# Patient Record
Sex: MALE | Race: OTHER | NOT HISPANIC OR LATINO
[De-identification: names, ages, dates, MRNs, and addresses within clinical notes are randomized per-mention and may not be internally consistent; named-entity substitution may affect disease eponyms.]

---

## 2020-07-16 PROBLEM — Z00.00 ENCOUNTER FOR PREVENTIVE HEALTH EXAMINATION: Status: ACTIVE | Noted: 2020-07-16

## 2020-07-17 ENCOUNTER — APPOINTMENT (OUTPATIENT)
Dept: COLORECTAL SURGERY | Facility: CLINIC | Age: 40
End: 2020-07-17
Payer: MEDICAID

## 2020-07-17 VITALS
RESPIRATION RATE: 15 BRPM | BODY MASS INDEX: 25.92 KG/M2 | SYSTOLIC BLOOD PRESSURE: 122 MMHG | HEIGHT: 69 IN | HEART RATE: 71 BPM | WEIGHT: 175 LBS | TEMPERATURE: 96.1 F | DIASTOLIC BLOOD PRESSURE: 78 MMHG

## 2020-07-17 DIAGNOSIS — E11.9 TYPE 2 DIABETES MELLITUS W/OUT COMPLICATIONS: ICD-10-CM

## 2020-07-17 DIAGNOSIS — K64.9 UNSPECIFIED HEMORRHOIDS: ICD-10-CM

## 2020-07-17 DIAGNOSIS — F17.200 NICOTINE DEPENDENCE, UNSPECIFIED, UNCOMPLICATED: ICD-10-CM

## 2020-07-17 PROCEDURE — 99204 OFFICE O/P NEW MOD 45 MIN: CPT | Mod: 25

## 2020-07-17 PROCEDURE — 46600 DIAGNOSTIC ANOSCOPY SPX: CPT

## 2020-07-17 RX ORDER — METFORMIN HYDROCHLORIDE 625 MG/1
TABLET ORAL
Refills: 0 | Status: ACTIVE | COMMUNITY

## 2020-07-17 NOTE — HISTORY OF PRESENT ILLNESS
[FreeTextEntry1] : Patient is a 40 yo male here with complaints of a painful swelling that comes and goes.  He states that it has drained puss in the past.  He states he has no issues with bowel movements.

## 2020-07-17 NOTE — ASSESSMENT
[FreeTextEntry1] : The patient is offered an exam under anesthesia with fistulotomy vs seton. R/B/A of the procedure are d/w him including but not limited to pain, bleeding, infection and incontinence. \par He understands and wishes to proceed.

## 2020-07-17 NOTE — PHYSICAL EXAM
[Normal Breath Sounds] : Normal breath sounds [Normal Rate and Rhythm] : normal rate and rhythm [Normal Heart Sounds] : normal heart sounds [No Rash or Lesion] : No rash or lesion [Oriented to Person] : oriented to person [Alert] : alert [Oriented to Time] : oriented to time [Oriented to Place] : oriented to place [Calm] : calm [Excoriation] : no perianal excoriation [Wart] : no warts [Ulcer ___ cm] : no ulcers [Normal] : was normal [None] : there was no rectal mass  [de-identified] : soft, NT/ND,+BS [de-identified] : right anterior area of induration, almost near the scrotal base, no purulent drainage currently or cellulitis [de-identified] : anoscopy reveals no proctitis, no obvious internal opening [de-identified] : well nourished female [de-identified] : NC/AT [de-identified] : SOTO/+ROM [de-identified] : Intact

## 2020-07-17 NOTE — CONSULT LETTER
[Dear  ___] : Dear  [unfilled], [Consult Letter:] : I had the pleasure of evaluating your patient, [unfilled]. [Please see my note below.] : Please see my note below. [Consult Closing:] : Thank you very much for allowing me to participate in the care of this patient.  If you have any questions, please do not hesitate to contact me. [Sincerely,] : Sincerely, [FreeTextEntry3] : Rubén Silvestre MD, FACS, FASCRS\par Colorectal Surgery\par [facility]\par Assistant Professor of Surgery [employer] at [facility]\par [address] [place] [address], [address]\[place] [address]\par Tel: [phone] \par Cell: [phone] \par Email: [email or web address]\par

## 2020-07-19 DIAGNOSIS — Z01.818 ENCOUNTER FOR OTHER PREPROCEDURAL EXAMINATION: ICD-10-CM

## 2020-07-20 ENCOUNTER — TRANSCRIPTION ENCOUNTER (OUTPATIENT)
Age: 40
End: 2020-07-20

## 2020-07-20 ENCOUNTER — APPOINTMENT (OUTPATIENT)
Dept: DISASTER EMERGENCY | Facility: CLINIC | Age: 40
End: 2020-07-20

## 2020-07-22 ENCOUNTER — TRANSCRIPTION ENCOUNTER (OUTPATIENT)
Age: 40
End: 2020-07-22

## 2020-07-22 VITALS
WEIGHT: 179.9 LBS | HEART RATE: 98 BPM | SYSTOLIC BLOOD PRESSURE: 119 MMHG | TEMPERATURE: 98 F | RESPIRATION RATE: 18 BRPM | DIASTOLIC BLOOD PRESSURE: 78 MMHG | HEIGHT: 69 IN | OXYGEN SATURATION: 99 %

## 2020-07-22 LAB — SARS-COV-2 N GENE NPH QL NAA+PROBE: NOT DETECTED

## 2020-07-22 RX ORDER — LIDOCAINE HCL 20 MG/ML
0.2 VIAL (ML) INJECTION ONCE
Refills: 0 | Status: DISCONTINUED | OUTPATIENT
Start: 2020-07-23 | End: 2020-08-07

## 2020-07-22 RX ORDER — SODIUM CHLORIDE 9 MG/ML
3 INJECTION INTRAMUSCULAR; INTRAVENOUS; SUBCUTANEOUS EVERY 8 HOURS
Refills: 0 | Status: DISCONTINUED | OUTPATIENT
Start: 2020-07-23 | End: 2020-08-07

## 2020-07-22 NOTE — H&P PST ADULT - NSICDXPROBLEM_GEN_ALL_CORE_FT
PROBLEM DIAGNOSES  Problem: Anal fistula  Assessment and Plan: Fistulotomy VS Seton on 7/23/2020.     Problem: Type 2 diabetes mellitus  Assessment and Plan: Check fingerstick DOS

## 2020-07-22 NOTE — H&P PST ADULT - NSANTHOSAYNRD_GEN_A_CORE
No. ALLEY screening performed.  STOP BANG Legend: 0-2 = LOW Risk; 3-4 = INTERMEDIATE Risk; 5-8 = HIGH Risk

## 2020-07-22 NOTE — H&P PST ADULT - HISTORY OF PRESENT ILLNESS
39 year old male with history of Type 2 DM, Anal fistula for past 1year, with drainage & pain last week, Coming in for Fistulotomy VS Seton on 7/23/2020. 39 year old male with history of Type 2 DM, Anal fistula for past 1year, with drainage & pain last week, Coming in for Fistulotomy VS Seton on 7/23/2020.     Covid test negative 7/20/2020  History  obtained via teleconference 7/22/2020 39 year old male with history of Type 2 DM, Anal fistula for past 1year, with drainage & pain last week,  Presents Fistulotomy 7/30/20. Pt reports he had surgery  7/23/2020 however he did not have a fistula at that time.     Covid test negative 7/29/2020  History  obtained via teleconference 7/22/2020

## 2020-07-23 ENCOUNTER — NON-APPOINTMENT (OUTPATIENT)
Age: 40
End: 2020-07-23

## 2020-07-23 ENCOUNTER — OUTPATIENT (OUTPATIENT)
Dept: OUTPATIENT SERVICES | Facility: HOSPITAL | Age: 40
LOS: 1 days | End: 2020-07-23
Payer: MEDICAID

## 2020-07-23 ENCOUNTER — APPOINTMENT (OUTPATIENT)
Dept: COLORECTAL SURGERY | Facility: HOSPITAL | Age: 40
End: 2020-07-23
Payer: MEDICAID

## 2020-07-23 VITALS
DIASTOLIC BLOOD PRESSURE: 54 MMHG | HEART RATE: 69 BPM | OXYGEN SATURATION: 100 % | RESPIRATION RATE: 16 BRPM | SYSTOLIC BLOOD PRESSURE: 105 MMHG

## 2020-07-23 DIAGNOSIS — K60.3 ANAL FISTULA: ICD-10-CM

## 2020-07-23 DIAGNOSIS — Z90.89 ACQUIRED ABSENCE OF OTHER ORGANS: Chronic | ICD-10-CM

## 2020-07-23 DIAGNOSIS — E11.9 TYPE 2 DIABETES MELLITUS WITHOUT COMPLICATIONS: ICD-10-CM

## 2020-07-23 LAB
A1C WITH ESTIMATED AVERAGE GLUCOSE RESULT: 7.5 % — HIGH (ref 4–5.6)
ANION GAP SERPL CALC-SCNC: 10 MMOL/L — SIGNIFICANT CHANGE UP (ref 5–17)
BASOPHILS # BLD AUTO: 0.09 K/UL — SIGNIFICANT CHANGE UP (ref 0–0.2)
BASOPHILS NFR BLD AUTO: 0.8 % — SIGNIFICANT CHANGE UP (ref 0–2)
BUN SERPL-MCNC: 10 MG/DL — SIGNIFICANT CHANGE UP (ref 7–23)
CALCIUM SERPL-MCNC: 9.1 MG/DL — SIGNIFICANT CHANGE UP (ref 8.4–10.5)
CHLORIDE SERPL-SCNC: 103 MMOL/L — SIGNIFICANT CHANGE UP (ref 96–108)
CO2 SERPL-SCNC: 24 MMOL/L — SIGNIFICANT CHANGE UP (ref 22–31)
CREAT SERPL-MCNC: 0.71 MG/DL — SIGNIFICANT CHANGE UP (ref 0.5–1.3)
EOSINOPHIL # BLD AUTO: 0.7 K/UL — HIGH (ref 0–0.5)
EOSINOPHIL NFR BLD AUTO: 6 % — SIGNIFICANT CHANGE UP (ref 0–6)
ESTIMATED AVERAGE GLUCOSE: 169 MG/DL — HIGH (ref 68–114)
GLUCOSE BLDC GLUCOMTR-MCNC: 137 MG/DL — HIGH (ref 70–99)
GLUCOSE SERPL-MCNC: 142 MG/DL — HIGH (ref 70–99)
HCT VFR BLD CALC: 44.3 % — SIGNIFICANT CHANGE UP (ref 39–50)
HGB BLD-MCNC: 15 G/DL — SIGNIFICANT CHANGE UP (ref 13–17)
LYMPHOCYTES # BLD AUTO: 4.9 K/UL — HIGH (ref 1–3.3)
LYMPHOCYTES # BLD AUTO: 41.9 % — SIGNIFICANT CHANGE UP (ref 13–44)
MCHC RBC-ENTMCNC: 29.2 PG — SIGNIFICANT CHANGE UP (ref 27–34)
MCHC RBC-ENTMCNC: 33.9 GM/DL — SIGNIFICANT CHANGE UP (ref 32–36)
MCV RBC AUTO: 86.2 FL — SIGNIFICANT CHANGE UP (ref 80–100)
MONOCYTES # BLD AUTO: 0.5 K/UL — SIGNIFICANT CHANGE UP (ref 0–0.9)
MONOCYTES NFR BLD AUTO: 4.3 % — SIGNIFICANT CHANGE UP (ref 2–14)
NEUTROPHILS # BLD AUTO: 5.49 K/UL — SIGNIFICANT CHANGE UP (ref 1.8–7.4)
NEUTROPHILS NFR BLD AUTO: 47 % — SIGNIFICANT CHANGE UP (ref 43–77)
PLATELET # BLD AUTO: 317 K/UL — SIGNIFICANT CHANGE UP (ref 150–400)
POTASSIUM SERPL-MCNC: 4 MMOL/L — SIGNIFICANT CHANGE UP (ref 3.5–5.3)
POTASSIUM SERPL-SCNC: 4 MMOL/L — SIGNIFICANT CHANGE UP (ref 3.5–5.3)
RBC # BLD: 5.14 M/UL — SIGNIFICANT CHANGE UP (ref 4.2–5.8)
RBC # FLD: 13.3 % — SIGNIFICANT CHANGE UP (ref 10.3–14.5)
SODIUM SERPL-SCNC: 137 MMOL/L — SIGNIFICANT CHANGE UP (ref 135–145)
WBC # BLD: 11.69 K/UL — HIGH (ref 3.8–10.5)
WBC # FLD AUTO: 11.69 K/UL — HIGH (ref 3.8–10.5)

## 2020-07-23 PROCEDURE — 46600 DIAGNOSTIC ANOSCOPY SPX: CPT

## 2020-07-23 PROCEDURE — 83036 HEMOGLOBIN GLYCOSYLATED A1C: CPT

## 2020-07-23 PROCEDURE — 80048 BASIC METABOLIC PNL TOTAL CA: CPT

## 2020-07-23 PROCEDURE — 82962 GLUCOSE BLOOD TEST: CPT

## 2020-07-23 PROCEDURE — 45990 SURG DX EXAM ANORECTAL: CPT

## 2020-07-23 PROCEDURE — 85027 COMPLETE CBC AUTOMATED: CPT

## 2020-07-23 NOTE — ASU DISCHARGE PLAN (ADULT/PEDIATRIC) - CARE PROVIDER_API CALL
Rubén Silvestre  COLON/RECTAL SURGERY  [address] [place] [address].  [place] [address]  Phone: [phone]  Fax: [phone]  Follow Up Time: 2 weeks

## 2020-07-27 PROBLEM — K60.3 ANAL FISTULA: Chronic | Status: ACTIVE | Noted: 2020-07-22

## 2020-07-27 PROBLEM — E11.9 TYPE 2 DIABETES MELLITUS WITHOUT COMPLICATIONS: Chronic | Status: ACTIVE | Noted: 2020-07-22

## 2020-07-29 ENCOUNTER — APPOINTMENT (OUTPATIENT)
Dept: COLORECTAL SURGERY | Facility: CLINIC | Age: 40
End: 2020-07-29
Payer: MEDICAID

## 2020-07-29 ENCOUNTER — TRANSCRIPTION ENCOUNTER (OUTPATIENT)
Age: 40
End: 2020-07-29

## 2020-07-29 DIAGNOSIS — R10.2 PELVIC AND PERINEAL PAIN: ICD-10-CM

## 2020-07-29 DIAGNOSIS — K60.3 ANAL FISTULA: ICD-10-CM

## 2020-07-29 PROCEDURE — 99213 OFFICE O/P EST LOW 20 MIN: CPT

## 2020-07-29 NOTE — HISTORY OF PRESENT ILLNESS
[FreeTextEntry1] : The patient presents with acute pain and swelling in the same spot as before. There is no break in the skin, no drainage

## 2020-07-29 NOTE — PHYSICAL EXAM
[None] : no anal fissures seen [Excoriation] : no perianal excoriation [Wart] : no warts [Ulcer ___ cm] : no ulcers [Normal Breath Sounds] : Normal breath sounds [Normal Heart Sounds] : normal heart sounds [Normal Rate and Rhythm] : normal rate and rhythm [No Rash or Lesion] : No rash or lesion [Alert] : alert [Oriented to Place] : oriented to place [Oriented to Person] : oriented to person [Oriented to Time] : oriented to time [de-identified] : soft, NT/ND,+BS [Calm] : calm [de-identified] : right anterior area of induration, almost near the scrotal base, no purulent drainage currently or cellulitis [de-identified] : NC/AT [de-identified] : well nourished female [de-identified] : Intact [de-identified] : SOTO/+ROM

## 2020-07-29 NOTE — ASSESSMENT
[FreeTextEntry1] : Pt will be taken urgently to the OR tomorrow for EUA and either excision of the area or fistulotomy vs seton\par covid test today

## 2020-07-30 ENCOUNTER — APPOINTMENT (OUTPATIENT)
Dept: COLORECTAL SURGERY | Facility: HOSPITAL | Age: 40
End: 2020-07-30
Payer: MEDICAID

## 2020-07-30 ENCOUNTER — OUTPATIENT (OUTPATIENT)
Dept: OUTPATIENT SERVICES | Facility: HOSPITAL | Age: 40
LOS: 1 days | End: 2020-07-30
Payer: MEDICAID

## 2020-07-30 ENCOUNTER — RESULT REVIEW (OUTPATIENT)
Age: 40
End: 2020-07-30

## 2020-07-30 VITALS
SYSTOLIC BLOOD PRESSURE: 119 MMHG | HEART RATE: 85 BPM | DIASTOLIC BLOOD PRESSURE: 64 MMHG | RESPIRATION RATE: 18 BRPM | TEMPERATURE: 97 F

## 2020-07-30 VITALS
OXYGEN SATURATION: 99 % | SYSTOLIC BLOOD PRESSURE: 112 MMHG | HEART RATE: 81 BPM | RESPIRATION RATE: 12 BRPM | DIASTOLIC BLOOD PRESSURE: 70 MMHG

## 2020-07-30 DIAGNOSIS — K60.3 ANAL FISTULA: ICD-10-CM

## 2020-07-30 DIAGNOSIS — Z90.89 ACQUIRED ABSENCE OF OTHER ORGANS: Chronic | ICD-10-CM

## 2020-07-30 LAB — GLUCOSE BLDC GLUCOMTR-MCNC: 148 MG/DL — HIGH (ref 70–99)

## 2020-07-30 PROCEDURE — 46922 EXCISION OF ANAL LESION(S): CPT

## 2020-07-30 PROCEDURE — 11400 EXC TR-EXT B9+MARG 0.5 CM<: CPT

## 2020-07-30 PROCEDURE — 88305 TISSUE EXAM BY PATHOLOGIST: CPT | Mod: 26

## 2020-07-30 PROCEDURE — C1889: CPT

## 2020-07-30 PROCEDURE — 10060 I&D ABSCESS SIMPLE/SINGLE: CPT | Mod: 59

## 2020-07-30 PROCEDURE — 88305 TISSUE EXAM BY PATHOLOGIST: CPT

## 2020-07-30 PROCEDURE — 82962 GLUCOSE BLOOD TEST: CPT

## 2020-07-30 RX ORDER — FOAM BANDAGE 4" X 4"
8"X12" BANDAGE TOPICAL
Qty: 1 | Refills: 0 | Status: ACTIVE | COMMUNITY
Start: 2020-07-30 | End: 1900-01-01

## 2020-07-30 RX ORDER — METFORMIN HYDROCHLORIDE 850 MG/1
1 TABLET ORAL
Qty: 0 | Refills: 0 | DISCHARGE

## 2020-07-30 RX ORDER — OXYCODONE HYDROCHLORIDE 5 MG/1
1 TABLET ORAL
Qty: 30 | Refills: 0
Start: 2020-07-30 | End: 2020-08-03

## 2020-07-30 RX ORDER — LIDOCAINE HCL 20 MG/ML
0.2 VIAL (ML) INJECTION ONCE
Refills: 0 | Status: DISCONTINUED | OUTPATIENT
Start: 2020-07-30 | End: 2020-08-14

## 2020-07-30 RX ORDER — SODIUM CHLORIDE 9 MG/ML
3 INJECTION INTRAMUSCULAR; INTRAVENOUS; SUBCUTANEOUS EVERY 8 HOURS
Refills: 0 | Status: DISCONTINUED | OUTPATIENT
Start: 2020-07-30 | End: 2020-08-14

## 2020-07-30 NOTE — ASU DISCHARGE PLAN (ADULT/PEDIATRIC) - NURSING INSTRUCTIONS
Last dose of TYLENOL for pain management was at 1:02 PM. Next dose of TYLENOL may be taken at or after 7:02 PM if needed. DO NOT take any products containing TYLENOL or ACETAMINOPHEN, such as VICODIN, PERCOCET, NORCO, EXCEDRIN, and any over-the-counter cold medications. DO NOT CONSUME MORE THAN 5124-0146 MG OF TYLENOL (acetaminophen) in a 24-hour period.

## 2020-07-30 NOTE — BRIEF OPERATIVE NOTE - NSICDXBRIEFPOSTOP_GEN_ALL_CORE_FT
POST-OP DIAGNOSIS:  Normal rectal exam 23-Jul-2020 10:07:43  Radha Arana
POST-OP DIAGNOSIS:  Perineal abscess 30-Jul-2020 13:35:45  Bassem Tucker

## 2020-07-30 NOTE — BRIEF OPERATIVE NOTE - NSICDXBRIEFPROCEDURE_GEN_ALL_CORE_FT
PROCEDURES:  Perineal biopsy 30-Jul-2020 14:51:17  Rubén Silvestre  Incision and drainage, abscess, vulva or perineal 30-Jul-2020 14:50:33  Rubén Silvestre

## 2020-07-30 NOTE — BRIEF OPERATIVE NOTE - NSICDXBRIEFPOSTOP_GEN_ALL_CORE_FT
POST-OP DIAGNOSIS:  Perineal lump 30-Jul-2020 14:51:53  Rubén Silvestre  Perineal abscess 30-Jul-2020 14:51:46  Rubén Silvestre

## 2020-07-30 NOTE — ASU DISCHARGE PLAN (ADULT/PEDIATRIC) - CARE PROVIDER_API CALL
Rubén Silvestre  COLON/RECTAL SURGERY  [address] [place] [address].  [place] [address]  Phone: [phone]  Fax: [phone]  Established Patient  Follow Up Time: 1 month

## 2020-07-30 NOTE — BRIEF OPERATIVE NOTE - NSICDXBRIEFPROCEDURE_GEN_ALL_CORE_FT
PROCEDURES:  Exam under anesthesia, rectal 23-Jul-2020 10:07:23  Radha Arana  Anoscopy without biopsy 23-Jul-2020 10:07:12  Radha Arana
PROCEDURES:  Incision and drainage, abscess, vulva or perineal 30-Jul-2020 13:34:57  Bassem Tucker  Excision biopsy of female perineal lesion 30-Jul-2020 13:34:43  Bassem Tucker  Surgical removal of sebaceous cyst of torso 30-Jul-2020 13:34:30  Bassem Tucker

## 2020-07-30 NOTE — ASU DISCHARGE PLAN (ADULT/PEDIATRIC) - FOLLOW UP APPOINTMENTS
[facility] ([facility]): [phone] or go to the nearest Emergency Room

## 2020-07-30 NOTE — BRIEF OPERATIVE NOTE - NSICDXBRIEFPREOP_GEN_ALL_CORE_FT
PRE-OP DIAGNOSIS:  Perineal lump 30-Jul-2020 14:51:36  Rubén Silvestre  Perineal abscess 30-Jul-2020 14:51:27  Rubén Silvestre

## 2020-08-19 ENCOUNTER — APPOINTMENT (OUTPATIENT)
Dept: COLORECTAL SURGERY | Facility: CLINIC | Age: 40
End: 2020-08-19
Payer: MEDICAID

## 2020-08-19 VITALS — SYSTOLIC BLOOD PRESSURE: 121 MMHG | RESPIRATION RATE: 98 BRPM | HEART RATE: 99 BPM | DIASTOLIC BLOOD PRESSURE: 77 MMHG

## 2020-08-19 PROCEDURE — 99213 OFFICE O/P EST LOW 20 MIN: CPT

## 2020-08-19 NOTE — HISTORY OF PRESENT ILLNESS
[FreeTextEntry1] : The patient overall feels well. last week he developed papules on his LEs, US, and buttocks. His PCP is treating him with PO abx, unclear the cause.

## 2020-08-19 NOTE — PHYSICAL EXAM
[None] : no anal fissures seen [Wart] : no warts [Excoriation] : no perianal excoriation [Ulcer ___ cm] : no ulcers [Normal Breath Sounds] : Normal breath sounds [Normal Rate and Rhythm] : normal rate and rhythm [No Rash or Lesion] : No rash or lesion [Normal Heart Sounds] : normal heart sounds [Oriented to Place] : oriented to place [Oriented to Person] : oriented to person [Alert] : alert [Calm] : calm [Oriented to Time] : oriented to time [de-identified] : right anterior wound with bed of granulation tissue, treated with silver nitrate sticks [de-identified] : well nourished female [de-identified] : soft, NT/ND,+BS [de-identified] : NC/AT [de-identified] : small papules on BLE and BUE [de-identified] : SOTO/+ROM

## 2020-09-02 ENCOUNTER — APPOINTMENT (OUTPATIENT)
Dept: COLORECTAL SURGERY | Facility: CLINIC | Age: 40
End: 2020-09-02
Payer: MEDICAID

## 2020-09-02 DIAGNOSIS — L08.9 SEBACEOUS CYST: ICD-10-CM

## 2020-09-02 DIAGNOSIS — L72.3 SEBACEOUS CYST: ICD-10-CM

## 2020-09-02 DIAGNOSIS — L02.215 CUTANEOUS ABSCESS OF PERINEUM: ICD-10-CM

## 2020-09-02 PROCEDURE — 99213 OFFICE O/P EST LOW 20 MIN: CPT

## 2020-09-02 NOTE — ASSESSMENT
[FreeTextEntry1] : unclear etiology of the pustules. Pt advised to see dermatology\par \par wound healing well\par local wound care\par f/u in 3 weeks

## 2020-09-02 NOTE — HISTORY OF PRESENT ILLNESS
[FreeTextEntry1] : The patient reports no pain from the wound, minimal drainage. His only c/o is of small pustules throughout his buttocks for which he is taking abx from his PCP.

## 2020-09-02 NOTE — PHYSICAL EXAM
[None] : no anal fissures seen [Excoriation] : no perianal excoriation [Wart] : no warts [Ulcer ___ cm] : no ulcers [Normal Breath Sounds] : Normal breath sounds [Normal Heart Sounds] : normal heart sounds [Normal Rate and Rhythm] : normal rate and rhythm [No Rash or Lesion] : No rash or lesion [Alert] : alert [Oriented to Person] : oriented to person [Oriented to Place] : oriented to place [Oriented to Time] : oriented to time [Calm] : calm [de-identified] : soft, NT/ND,+BS [de-identified] : right anterior wound with bed of granulation tissue, much smaller than on previous exam, treated with silver nitrate sticks, multiple scattered bilateral buttock and perianal small pustules, no abscess to drain [de-identified] : well nourished female [de-identified] : NC/AT [de-identified] : SOTO/+ROM [de-identified] : small papules on BLE and BUE

## 2020-09-23 ENCOUNTER — APPOINTMENT (OUTPATIENT)
Dept: COLORECTAL SURGERY | Facility: CLINIC | Age: 40
End: 2020-09-23